# Patient Record
Sex: FEMALE | Race: WHITE | NOT HISPANIC OR LATINO
[De-identification: names, ages, dates, MRNs, and addresses within clinical notes are randomized per-mention and may not be internally consistent; named-entity substitution may affect disease eponyms.]

---

## 2020-07-06 PROBLEM — Z00.00 ENCOUNTER FOR PREVENTIVE HEALTH EXAMINATION: Status: ACTIVE | Noted: 2020-07-06

## 2020-07-09 ENCOUNTER — APPOINTMENT (OUTPATIENT)
Dept: ORTHOPEDIC SURGERY | Facility: CLINIC | Age: 55
End: 2020-07-09
Payer: MEDICAID

## 2020-07-09 DIAGNOSIS — Z78.9 OTHER SPECIFIED HEALTH STATUS: ICD-10-CM

## 2020-07-09 DIAGNOSIS — Z82.62 FAMILY HISTORY OF OSTEOPOROSIS: ICD-10-CM

## 2020-07-09 DIAGNOSIS — Z83.3 FAMILY HISTORY OF DIABETES MELLITUS: ICD-10-CM

## 2020-07-09 DIAGNOSIS — I83.93 ASYMPTOMATIC VARICOSE VEINS OF BILATERAL LOWER EXTREMITIES: ICD-10-CM

## 2020-07-09 DIAGNOSIS — M67.432 GANGLION, LEFT WRIST: ICD-10-CM

## 2020-07-09 PROCEDURE — 99204 OFFICE O/P NEW MOD 45 MIN: CPT

## 2020-07-09 RX ORDER — MELOXICAM 15 MG/1
15 TABLET ORAL DAILY
Qty: 14 | Refills: 0 | Status: ACTIVE | COMMUNITY
Start: 2020-07-09 | End: 1900-01-01

## 2020-07-09 RX ORDER — DICLOFENAC SODIUM 10 MG/G
1 GEL TOPICAL DAILY
Qty: 1 | Refills: 0 | Status: ACTIVE | COMMUNITY
Start: 2020-07-09 | End: 1900-01-01

## 2020-08-03 ENCOUNTER — RESULT REVIEW (OUTPATIENT)
Age: 55
End: 2020-08-03

## 2020-08-03 ENCOUNTER — OUTPATIENT (OUTPATIENT)
Dept: OUTPATIENT SERVICES | Facility: HOSPITAL | Age: 55
LOS: 1 days | End: 2020-08-03

## 2020-08-03 ENCOUNTER — APPOINTMENT (OUTPATIENT)
Dept: ULTRASOUND IMAGING | Facility: CLINIC | Age: 55
End: 2020-08-03
Payer: MEDICAID

## 2020-08-03 DIAGNOSIS — M65.4 RADIAL STYLOID TENOSYNOVITIS [DE QUERVAIN]: ICD-10-CM

## 2020-08-03 PROCEDURE — 20606 DRAIN/INJ JOINT/BURSA W/US: CPT | Mod: LT

## 2020-08-03 PROCEDURE — 73110 X-RAY EXAM OF WRIST: CPT | Mod: 26,LT

## 2020-11-12 ENCOUNTER — APPOINTMENT (OUTPATIENT)
Dept: ORTHOPEDIC SURGERY | Facility: CLINIC | Age: 55
End: 2020-11-12
Payer: MEDICAID

## 2020-11-12 VITALS
HEART RATE: 72 BPM | SYSTOLIC BLOOD PRESSURE: 107 MMHG | DIASTOLIC BLOOD PRESSURE: 71 MMHG | WEIGHT: 135 LBS | BODY MASS INDEX: 22.49 KG/M2 | HEIGHT: 65 IN

## 2020-11-12 DIAGNOSIS — M65.4 RADIAL STYLOID TENOSYNOVITIS [DE QUERVAIN]: ICD-10-CM

## 2020-11-12 PROCEDURE — 99214 OFFICE O/P EST MOD 30 MIN: CPT | Mod: 25

## 2020-11-12 PROCEDURE — 20550 NJX 1 TENDON SHEATH/LIGAMENT: CPT | Mod: LT

## 2020-11-12 PROCEDURE — 99072 ADDL SUPL MATRL&STAF TM PHE: CPT

## 2020-11-12 RX ORDER — METHYLPREDNISOLONE ACETATE 40 MG/ML
40 INJECTION, SUSPENSION INTRA-ARTICULAR; INTRALESIONAL; INTRAMUSCULAR; SOFT TISSUE
Qty: 0.5 | Refills: 0 | Status: ACTIVE | COMMUNITY
Start: 2020-11-12

## 2020-11-12 RX ORDER — MELOXICAM 15 MG/1
15 TABLET ORAL DAILY
Qty: 14 | Refills: 0 | Status: ACTIVE | COMMUNITY
Start: 2020-11-12 | End: 1900-01-01

## 2020-11-12 RX ORDER — DICLOFENAC SODIUM 1% 10 MG/G
1 GEL TOPICAL
Qty: 1 | Refills: 0 | Status: ACTIVE | COMMUNITY
Start: 2020-11-12 | End: 1900-01-01

## 2021-11-29 ENCOUNTER — TRANSCRIPTION ENCOUNTER (OUTPATIENT)
Age: 56
End: 2021-11-29

## 2021-12-29 ENCOUNTER — TRANSCRIPTION ENCOUNTER (OUTPATIENT)
Age: 56
End: 2021-12-29

## 2022-08-15 ENCOUNTER — APPOINTMENT (OUTPATIENT)
Dept: VASCULAR SURGERY | Facility: CLINIC | Age: 57
End: 2022-08-15

## 2022-08-15 VITALS
WEIGHT: 138 LBS | HEIGHT: 65 IN | DIASTOLIC BLOOD PRESSURE: 71 MMHG | OXYGEN SATURATION: 98 % | RESPIRATION RATE: 14 BRPM | BODY MASS INDEX: 22.99 KG/M2 | TEMPERATURE: 96.9 F | SYSTOLIC BLOOD PRESSURE: 112 MMHG | HEART RATE: 66 BPM

## 2022-08-15 DIAGNOSIS — Z82.49 FAMILY HISTORY OF ISCHEMIC HEART DISEASE AND OTHER DISEASES OF THE CIRCULATORY SYSTEM: ICD-10-CM

## 2022-08-15 PROCEDURE — 99203 OFFICE O/P NEW LOW 30 MIN: CPT

## 2022-08-15 PROCEDURE — 93970 EXTREMITY STUDY: CPT

## 2022-08-29 PROBLEM — Z82.49 FAMILY HISTORY OF CARDIAC DISORDER: Status: ACTIVE | Noted: 2022-08-22

## 2022-08-29 PROBLEM — Z82.49 FAMILY HISTORY OF CARDIAC PACEMAKER: Status: ACTIVE | Noted: 2022-08-22

## 2022-10-03 ENCOUNTER — APPOINTMENT (OUTPATIENT)
Dept: VASCULAR SURGERY | Facility: CLINIC | Age: 57
End: 2022-10-03

## 2022-10-28 ENCOUNTER — APPOINTMENT (OUTPATIENT)
Dept: VASCULAR SURGERY | Facility: CLINIC | Age: 57
End: 2022-10-28

## 2022-10-28 VITALS
TEMPERATURE: 97.2 F | HEART RATE: 59 BPM | SYSTOLIC BLOOD PRESSURE: 109 MMHG | OXYGEN SATURATION: 98 % | RESPIRATION RATE: 16 BRPM | HEIGHT: 65 IN | DIASTOLIC BLOOD PRESSURE: 71 MMHG | BODY MASS INDEX: 23.82 KG/M2 | WEIGHT: 143 LBS

## 2022-10-28 PROCEDURE — 36465Z: CUSTOM

## 2022-10-28 NOTE — PROCEDURE
[FreeTextEntry1] : RLE GSV chemical foam ablation  [FreeTextEntry2] : varicose veins with pain and venous insufficiency [FreeTextEntry3] : After informed consent obtained, under aseptic technique 6  mL of Varithena polidocanol solution was used to perform chemical foam ablation of  R GSV below the knee with a 5F micropuncture needle. Compression held over patent perforators. Good result. A sterile pressure dressing was applied with compression ACE bandage. The patient tolerated the procedure well. \par \par Plan\par Remove ACE wrap in 48 hours.\par Continue to wear ACE wrap or compression stockings daily until follow up visit\par Repeat venous duplex in one week to rule out DVT\par Follow up visit in office in 2 weeks\par

## 2022-11-01 ENCOUNTER — APPOINTMENT (OUTPATIENT)
Dept: VASCULAR SURGERY | Facility: CLINIC | Age: 57
End: 2022-11-01

## 2022-11-01 PROCEDURE — 93971 EXTREMITY STUDY: CPT

## 2022-11-14 ENCOUNTER — APPOINTMENT (OUTPATIENT)
Dept: VASCULAR SURGERY | Facility: CLINIC | Age: 57
End: 2022-11-14

## 2022-11-14 VITALS
OXYGEN SATURATION: 96 % | RESPIRATION RATE: 16 BRPM | WEIGHT: 144 LBS | BODY MASS INDEX: 23.99 KG/M2 | DIASTOLIC BLOOD PRESSURE: 78 MMHG | HEART RATE: 83 BPM | HEIGHT: 65 IN | TEMPERATURE: 96.2 F | SYSTOLIC BLOOD PRESSURE: 115 MMHG

## 2022-11-14 PROCEDURE — 99214 OFFICE O/P EST MOD 30 MIN: CPT

## 2022-11-14 NOTE — HISTORY OF PRESENT ILLNESS
[FreeTextEntry1] : 57F with h/o varicose veins previously had LLE vein stripping ~18yrs ago presenting for evaluation of recurrent BLE varicose and spider veins. States she has had ongoing discomfort with swelling and pain in her legs. States in the AM, her legs are smaller, less swollen, and less painful but over the course of the day, they become more swollen and larger, with prominent veins and sore. She has compression socks which are too painful to wear on her legs, but she has been wearing compression leggings as she is able to tolerate.  No prior history of DVT or SVT. [de-identified] : s/p varithena foam ablation of RLE\par Patient doing well, except c/o pain over medial knee with "hard veins" likely due to phlebitis\par Has not been compliant with compression stockings post-procedure\par States she stands for 15 hours for work

## 2022-11-14 NOTE — PHYSICAL EXAM
[Normal Rate and Rhythm] : normal rate and rhythm [2+] : left 2+ [Ankle Swelling (On Exam)] : not present [Varicose Veins Of Lower Extremities] : bilaterally [Ankle Swelling On The Left] : moderate [Abdomen Tenderness] : ~T ~M No abdominal tenderness [No Rash or Lesion] : No rash or lesion [Alert] : alert [Oriented to Person] : oriented to person [Oriented to Place] : oriented to place [Oriented to Time] : oriented to time [Calm] : calm [de-identified] : NAD [de-identified] : supple, no masses [General Appearance - Alert] : alert [General Appearance - In No Acute Distress] : in no acute distress [General Appearance - Well Nourished] : well nourished [General Appearance - Well Developed] : well developed [General Appearance - Well-Appearing] : healthy appearing [Neck Appearance] : the appearance of the neck was normal [] : no respiratory distress [Full Pulse] : the pedal pulses are present [FreeTextEntry1] : scattered spider veins throughout BLE, prominent varicose veins, tender to touch [Oriented To Time, Place, And Person] : oriented to person, place, and time [Affect] : the affect was normal [Mood] : the mood was normal

## 2022-11-14 NOTE — ASSESSMENT
[FreeTextEntry1] : s/p varithena foam ablation of RLE\par Patient doing well, except c/o pain over medial knee with "hard veins" likely due to phlebitis\par Has not been compliant with compression stockings post-procedure\par States she stands for 15 hours for work [Arterial/Venous Disease] : arterial/venous disease [Foot care/Footwear] : foot care/footwear

## 2022-11-14 NOTE — DISCUSSION/SUMMARY
[FreeTextEntry1] : 57F presenting with symptomatic varicose and spider veins. \par \par Venous duplex in office demonstrates R GSV reflux > 2 sec from knee to ankle and LLE incompetent GSV branches and varicosities\par \par

## 2022-11-28 ENCOUNTER — APPOINTMENT (OUTPATIENT)
Dept: VASCULAR SURGERY | Facility: CLINIC | Age: 57
End: 2022-11-28

## 2022-11-28 VITALS
HEIGHT: 65 IN | WEIGHT: 143 LBS | DIASTOLIC BLOOD PRESSURE: 71 MMHG | BODY MASS INDEX: 23.82 KG/M2 | OXYGEN SATURATION: 98 % | SYSTOLIC BLOOD PRESSURE: 114 MMHG | TEMPERATURE: 96.2 F | HEART RATE: 60 BPM | RESPIRATION RATE: 16 BRPM

## 2022-11-28 PROCEDURE — 99214 OFFICE O/P EST MOD 30 MIN: CPT

## 2022-11-28 RX ORDER — COVID-19 ANTIGEN TEST
KIT MISCELLANEOUS
Qty: 2 | Refills: 0 | Status: ACTIVE | COMMUNITY
Start: 2022-11-01

## 2022-11-28 RX ORDER — CYCLOBENZAPRINE HYDROCHLORIDE 10 MG/1
10 TABLET, FILM COATED ORAL
Qty: 10 | Refills: 0 | Status: ACTIVE | COMMUNITY
Start: 2022-07-13

## 2022-11-28 RX ORDER — AMOXICILLIN AND CLAVULANATE POTASSIUM 875; 125 MG/1; MG/1
875-125 TABLET, COATED ORAL
Qty: 20 | Refills: 0 | Status: ACTIVE | COMMUNITY
Start: 2022-09-26

## 2022-11-28 RX ORDER — ALENDRONATE SODIUM 70 MG/1
70 TABLET ORAL
Qty: 4 | Refills: 0 | Status: ACTIVE | COMMUNITY
Start: 2022-10-31

## 2022-11-28 RX ORDER — FLUTICASONE PROPIONATE 50 UG/1
50 SPRAY, METERED NASAL
Qty: 16 | Refills: 0 | Status: ACTIVE | COMMUNITY
Start: 2022-09-26

## 2022-11-28 RX ORDER — NAPROXEN 500 MG/1
500 TABLET ORAL
Qty: 20 | Refills: 0 | Status: ACTIVE | COMMUNITY
Start: 2022-07-13

## 2022-11-28 NOTE — PHYSICAL EXAM
[Normal Rate and Rhythm] : normal rate and rhythm [2+] : left 2+ [Ankle Swelling (On Exam)] : not present [Varicose Veins Of Lower Extremities] : bilaterally [Ankle Swelling On The Left] : moderate [Abdomen Tenderness] : ~T ~M No abdominal tenderness [No Rash or Lesion] : No rash or lesion [Alert] : alert [Oriented to Person] : oriented to person [Oriented to Place] : oriented to place [Oriented to Time] : oriented to time [Calm] : calm [de-identified] : NAD [de-identified] : supple, no masses [de-identified] : FROM of all 4 extremities\par  [General Appearance - Alert] : alert [General Appearance - In No Acute Distress] : in no acute distress [General Appearance - Well Nourished] : well nourished [General Appearance - Well Developed] : well developed [General Appearance - Well-Appearing] : healthy appearing [Neck Appearance] : the appearance of the neck was normal [] : no respiratory distress [Full Pulse] : the pedal pulses are present [FreeTextEntry1] : scattered spider veins throughout BLE, prominent varicose veins, tender to touch [Oriented To Time, Place, And Person] : oriented to person, place, and time [Affect] : the affect was normal [Mood] : the mood was normal

## 2022-11-28 NOTE — HISTORY OF PRESENT ILLNESS
[FreeTextEntry1] : 57F with h/o varicose veins previously had LLE vein stripping ~18yrs ago presenting for evaluation of recurrent BLE varicose and spider veins. States she has had ongoing discomfort with swelling and pain in her legs. States in the AM, her legs are smaller, less swollen, and less painful but over the course of the day, they become more swollen and larger, with prominent veins and sore. She has compression socks which are too painful to wear on her legs, but she has been wearing compression leggings as she is able to tolerate.  No prior history of DVT or SVT. [de-identified] : s/p varithena foam ablation of RLE\par Patient doing well, except c/o pain over medial knee with "hard veins" likely due to phlebitis\par Currently compliant with compression stockings and states phlebitic pain is improving. \par States she stands for 15 hours for work\par She continues to c/o discomfort in small, superficial varicose veins over both lower extremities

## 2022-12-09 ENCOUNTER — APPOINTMENT (OUTPATIENT)
Dept: VASCULAR SURGERY | Facility: CLINIC | Age: 57
End: 2022-12-09

## 2022-12-09 VITALS
TEMPERATURE: 96.3 F | DIASTOLIC BLOOD PRESSURE: 79 MMHG | BODY MASS INDEX: 23.16 KG/M2 | HEIGHT: 65 IN | OXYGEN SATURATION: 97 % | SYSTOLIC BLOOD PRESSURE: 124 MMHG | HEART RATE: 69 BPM | RESPIRATION RATE: 14 BRPM | WEIGHT: 139 LBS

## 2022-12-09 DIAGNOSIS — I87.2 VENOUS INSUFFICIENCY (CHRONIC) (PERIPHERAL): ICD-10-CM

## 2022-12-09 DIAGNOSIS — I83.813 VARICOSE VEINS OF BILATERAL LOWER EXTREMITIES WITH PAIN: ICD-10-CM

## 2022-12-09 PROCEDURE — 36468 NJX SCLRSNT SPIDER VEINS: CPT

## 2022-12-09 NOTE — PROCEDURE
[FreeTextEntry1] : bilateral LE sclerotherapy [FreeTextEntry2] : varicose veins with pain [FreeTextEntry3] : After informed consent obtained, under aseptic technique 2 vials or 4 mL of 1% polidocanol sclerosing solution were used to perform sclerotherapy of varicose veins of both lower extremities with a 33 gauge needle. A sterile pressure dressing was applied. The patient tolerated the procedure well.\par

## 2023-01-06 ENCOUNTER — APPOINTMENT (OUTPATIENT)
Dept: VASCULAR SURGERY | Facility: CLINIC | Age: 58
End: 2023-01-06

## 2023-10-23 ENCOUNTER — APPOINTMENT (OUTPATIENT)
Dept: VASCULAR SURGERY | Facility: CLINIC | Age: 58
End: 2023-10-23